# Patient Record
Sex: FEMALE | URBAN - METROPOLITAN AREA
[De-identification: names, ages, dates, MRNs, and addresses within clinical notes are randomized per-mention and may not be internally consistent; named-entity substitution may affect disease eponyms.]

---

## 2024-04-12 ENCOUNTER — TELEPHONE (OUTPATIENT)
Age: 44
End: 2024-04-12

## 2024-04-12 NOTE — TELEPHONE ENCOUNTER
Patient called today to re establish care with Dr Pang.    Patient is scheduled to be seen on 6/19 at 1 PM.    Pt is questioning if provider would like to order a KUB prior?    Pt will have her records faxed to office prior to visit.    Call back [phone]    Clothing

## 2024-04-14 DIAGNOSIS — N20.0 KIDNEY STONE: Primary | ICD-10-CM

## 2024-04-15 NOTE — TELEPHONE ENCOUNTER
Spoke with patient and made aware of KUB order. Patient will have done an hour prior to her appointment.

## 2024-06-17 ENCOUNTER — TELEPHONE (OUTPATIENT)
Dept: UROLOGY | Facility: CLINIC | Age: 44
End: 2024-06-17

## 2024-06-17 RX ORDER — BLOOD SUGAR DIAGNOSTIC
STRIP MISCELLANEOUS
COMMUNITY
Start: 2024-03-15

## 2024-06-17 RX ORDER — LANOLIN ALCOHOL/MO/W.PET/CERES
50 CREAM (GRAM) TOPICAL DAILY
COMMUNITY

## 2024-06-17 RX ORDER — LORATADINE 10 MG/1
10 TABLET ORAL DAILY
COMMUNITY

## 2024-06-17 RX ORDER — SEMAGLUTIDE 0.68 MG/ML
0.5 INJECTION, SOLUTION SUBCUTANEOUS
COMMUNITY
Start: 2024-04-01

## 2024-06-17 RX ORDER — ACETAMINOPHEN AND CODEINE PHOSPHATE 120; 12 MG/5ML; MG/5ML
1 SOLUTION ORAL DAILY
COMMUNITY
End: 2024-06-19 | Stop reason: ALTCHOICE

## 2024-06-17 RX ORDER — PNV NO.95/FERROUS FUM/FOLIC AC 28MG-0.8MG
2 TABLET ORAL DAILY
COMMUNITY

## 2024-06-17 NOTE — TELEPHONE ENCOUNTER
Left message that patient is in need of a KUB before her upcoming appointment. Left on message that she can have this done at the [facility] an hour before her appointment on 6/19/24. If patient returns call please make her aware of the above message.

## 2024-06-17 NOTE — TELEPHONE ENCOUNTER
Patient called stating she will do the xray prior to coming for appointment on 06/19/24. No further action needed

## 2024-06-19 ENCOUNTER — OFFICE VISIT (OUTPATIENT)
Dept: UROLOGY | Facility: CLINIC | Age: 44
End: 2024-06-19
Payer: COMMERCIAL

## 2024-06-19 ENCOUNTER — HOSPITAL ENCOUNTER (OUTPATIENT)
Dept: RADIOLOGY | Facility: HOSPITAL | Age: 44
Discharge: HOME | End: 2024-06-19
Payer: COMMERCIAL

## 2024-06-19 VITALS
WEIGHT: 136.8 LBS | TEMPERATURE: 98.1 F | SYSTOLIC BLOOD PRESSURE: 128 MMHG | HEIGHT: 60 IN | DIASTOLIC BLOOD PRESSURE: 64 MMHG | OXYGEN SATURATION: 99 % | HEART RATE: 103 BPM | RESPIRATION RATE: 16 BRPM | BODY MASS INDEX: 26.86 KG/M2

## 2024-06-19 DIAGNOSIS — N20.0 CALCULUS OF KIDNEY: Primary | ICD-10-CM

## 2024-06-19 DIAGNOSIS — E11.9 TYPE 2 DIABETES MELLITUS WITHOUT COMPLICATION, WITHOUT LONG-TERM CURRENT USE OF INSULIN (HCC): ICD-10-CM

## 2024-06-19 DIAGNOSIS — N20.0 KIDNEY STONE: ICD-10-CM

## 2024-06-19 LAB
SL AMB  POCT GLUCOSE, UA: ABNORMAL
SL AMB LEUKOCYTE ESTERASE,UA: ABNORMAL
SL AMB POCT BILIRUBIN,UA: ABNORMAL
SL AMB POCT BLOOD,UA: ABNORMAL
SL AMB POCT CLARITY,UA: CLEAR
SL AMB POCT COLOR,UA: YELLOW
SL AMB POCT KETONES,UA: ABNORMAL
SL AMB POCT NITRITE,UA: ABNORMAL
SL AMB POCT PH,UA: 6.5
SL AMB POCT SPECIFIC GRAVITY,UA: 1.02
SL AMB POCT URINE PROTEIN: ABNORMAL
SL AMB POCT UROBILINOGEN: 0.2

## 2024-06-19 PROCEDURE — 81003 URINALYSIS AUTO W/O SCOPE: CPT | Performed by: UROLOGY

## 2024-06-19 PROCEDURE — 99214 OFFICE O/P EST MOD 30 MIN: CPT | Performed by: UROLOGY

## 2024-06-19 PROCEDURE — 74018 RADEX ABDOMEN 1 VIEW: CPT

## 2024-06-19 RX ORDER — HYDROCODONE BITARTRATE AND ACETAMINOPHEN 5; 325 MG/1; MG/1
1 TABLET ORAL EVERY 6 HOURS PRN
Qty: 20 TABLET | Refills: 0 | Status: SHIPPED | OUTPATIENT
Start: 2024-06-19

## 2024-06-19 RX ORDER — ATORVASTATIN CALCIUM 80 MG/1
80 TABLET, FILM COATED ORAL DAILY
COMMUNITY
Start: 2024-05-20

## 2024-06-19 RX ORDER — VALACYCLOVIR HYDROCHLORIDE 500 MG/1
500 TABLET, FILM COATED ORAL AS NEEDED
COMMUNITY

## 2024-06-19 RX ORDER — HYDROCODONE BITARTRATE AND ACETAMINOPHEN 5; 325 MG/1; MG/1
1 TABLET ORAL EVERY 6 HOURS PRN
COMMUNITY

## 2024-06-19 RX ORDER — ACETAMINOPHEN AND CODEINE PHOSPHATE 120; 12 MG/5ML; MG/5ML
1 SOLUTION ORAL DAILY
COMMUNITY

## 2024-06-19 RX ORDER — NICOTINE POLACRILEX 4 MG/1
GUM, CHEWING ORAL DAILY
COMMUNITY
Start: 2024-05-06

## 2024-06-19 RX ORDER — TAMSULOSIN HYDROCHLORIDE 0.4 MG/1
0.4 CAPSULE ORAL AS NEEDED
COMMUNITY

## 2024-06-19 RX ORDER — MULTIVITAMIN
1 TABLET ORAL DAILY
COMMUNITY

## 2024-06-19 NOTE — PROGRESS NOTES
UROLOGY PROGRESS NOTE         NAME: Yelitza Otto  AGE: 44 y.o. SEX: female  : 1980   MRN: [id number]    DATE: 2024  TIME: 1:43 PM    Assessment and Plan      Impression:   1. Calculus of kidney  -     POCT urine dip auto non-scope  -     HYDROcodone-acetaminophen (Norco) 5-325 mg per tablet; Take 1 tablet by mouth every 6 (six) hours as needed for pain Max Daily Amount: 4 tablets  -     XR abdomen 1 view kub; Future; Expected date: 2024  2. Type 2 diabetes mellitus without complication, without long-term current use of insulin (HCC)       Plan: Patient is doing well she did have an episode of stone pain with passage of a small stone about a month or so ago.  She intermittently has these episodes.  Her KUB x-ray shows multiple tiny stones in each kidney.  Similar to that noted previously about a year or so ago.  Is important for her to have a pain medication on hand and I am calling in [place] 1-2 p.o. every 6 hours as needed in the event of stone colic.  She is compliant and rarely uses the medication.  The last prescription she received was about 2 years ago.  She is presently doing well without gross hematuria she had some mild microhematuria which is expected given her renal calculi.  We again discussed stone prevention she remains on vitamin B6 magnesium and total fluids of at least 60 ounces daily.  She is on a dietary regimen for calcium oxalate stone prevention.  I will see her back in 1 year with a KUB.  We can see her sooner if issues      Chief Complaint     Chief Complaint   Patient presents with   • New Patient Visit     New patient visit has a history of kidney stones. Patient had KUB done today. Was seen by DR. Pang in the past.     History of Present Illness     HPI: Yelitza Otto is a 44 y.o. year old female who presents with a longstanding history of bilateral renal calculi with occasional stone passage.  Has undergone multiple lithotripsies and stone procedures in the past.   Patient was seen last in [place].  KUB x-ray done earlier today which will be reviewed.  No gross hematuria nausea or vomiting at this point.  Last creatinine normal with PCP.  Is now on Ozempic.              The following portions of the patient's history were reviewed and updated as appropriate: allergies, current medications, past family history, past medical history, past social history, past surgical history and problem list.  Past Medical History:   Diagnosis Date   • Diabetes mellitus type 2, controlled (HCC)    • Hyperlipidemia with target LDL less than 100    • Renal stones     calcium oxalate     Past Surgical History:   Procedure Laterality Date   • LITHOTRIPSY      multiple   • OOPHORECTOMY       shoulder  Review of Systems     Const: Denies chills, fever and weight loss.  CV: Denies chest pain.  Resp: Denies SOB.  GI: Denies abdominal pain, nausea and vomiting.  : Denies symptoms other than stated above.  Musculo: Denies back pain.    Objective   /64   Pulse 103   Temp 98.1 °F (36.7 °C) (Tympanic)   Resp 16   Ht 5' (1.524 m)   Wt 62.1 kg (136 lb 12.8 oz)   SpO2 99%   BMI 26.72 kg/m²     Physical Exam  Const: Appears healthy and well developed. No signs of acute distress present.  Resp: Respirations are regular and unlabored.   CV: Rate is regular. Rhythm is regular.  Abdomen: Abdomen is soft, nontender, and nondistended. Kidneys are not palpable.  : Not performed  Psych: Patient's attitude is cooperative. Mood is normal. Affect is normal.    Procedure   Procedures     Current Medications     Current Outpatient Medications:   •  atorvastatin (LIPITOR) 80 mg tablet, Take 80 mg by mouth daily, Disp: , Rfl:   •  Ferrous Sulfate (Iron) 325 (65 Fe) MG TABS, Take 2 tablets by mouth daily, Disp: , Rfl:   •  HYDROcodone-acetaminophen (NORCO) 5-325 mg per tablet, Take 1 tablet by mouth every 6 (six) hours as needed for pain, Disp: , Rfl:   •  HYDROcodone-acetaminophen (Norco) 5-325 mg per  tablet, Take 1 tablet by mouth every 6 (six) hours as needed for pain Max Daily Amount: 4 tablets, Disp: 20 tablet, Rfl: 0  •  loratadine (CLARITIN) 10 mg tablet, Take 10 mg by mouth daily, Disp: , Rfl:   •  MAGNESIUM CITRATE PO, Take 500 mg by mouth in the morning, Disp: , Rfl:   •  Multiple Vitamin (multivitamin) tablet, Take 1 tablet by mouth daily, Disp: , Rfl:   •  norethindrone (MICRONOR) 0.35 MG tablet, Take 1 tablet by mouth daily, Disp: , Rfl:   •  Omeprazole 20 MG TBEC, in the morning, Disp: , Rfl:   •  OneTouch Verio test strip, test as directed, Disp: , Rfl:   •  Ozempic, 0.25 or 0.5 MG/DOSE, 2 MG/3ML injection pen, Inject 0.5 mg under the skin every 7 days, Disp: , Rfl:   •  pyridoxine (VITAMIN B6) 50 mg tablet, Take 50 mg by mouth daily, Disp: , Rfl:   •  tamsulosin (FLOMAX) 0.4 mg, Take 0.4 mg by mouth if needed, Disp: , Rfl:   •  valACYclovir (VALTREX) 500 mg tablet, Take 500 mg by mouth as needed, Disp: , Rfl:         Jeremias Pang MD

## 2025-03-13 ENCOUNTER — TELEPHONE (OUTPATIENT)
Age: 45
End: 2025-03-13

## 2025-03-13 NOTE — TELEPHONE ENCOUNTER
Patient called to confirm order for KUB on chart .    No further assistance is needed at this time .

## 2025-06-23 ENCOUNTER — TELEPHONE (OUTPATIENT)
Dept: UROLOGY | Facility: CLINIC | Age: 45
End: 2025-06-23

## 2025-06-24 NOTE — PROGRESS NOTES
UROLOGY PROGRESS NOTE         NAME: Yelitza Otto  AGE: 45 y.o. SEX: female  : 1980   MRN: [id number]    DATE: 2025  TIME: 9:25 AM    Assessment and Plan      Impression:   1. Calculus of kidney  -     POCT urine dip auto non-scope  -     tamsulosin (FLOMAX) 0.4 mg; Take 1 capsule (0.4 mg total) by mouth daily with dinner  -     ondansetron (ZOFRAN) 4 mg tablet; Take 1 tablet (4 mg total) by mouth every 8 (eight) hours as needed for nausea or vomiting  -     XR abdomen 1 view kub; Future; Expected date: 2025  2. Type 2 diabetes mellitus without complication, without long-term current use of insulin ([facility])       Plan: Patient has what appears to be a distal left ureteral stone on imaging today.  This was a KUB.  Probably 5 mm.  Her typical stones in each kidney not much change.  No symptoms.  UA positive for blood.  Urine culture sent.  No symptoms.  She wants to try to pass this since she has passed multiple stones previously.  I will see her back with a KUB in about 6 weeks.  She will call if there are any issues she will be on Flomax and she has medication if needed for pain or nausea.  She understands reasons to go to the emergency room including pain despite her medications, persistent nausea vomiting, or fever.      Chief Complaint     Chief Complaint   Patient presents with   • Follow-up     Pt reports today for follow up kidney stones.    • Urinary Urgency     History of Present Illness     HPI: Yelitza Otto is a 45 y.o. year old female who presents with history of bilateral nephrolithiasis.              The following portions of the patient's history were reviewed and updated as appropriate: allergies, current medications, past family history, past medical history, past social history, past surgical history and problem list.  Past Medical History[1]  Past Surgical History[2]  shoulder  Review of Systems     Const: Denies chills, fever and weight loss.  CV: Denies chest pain.  Resp: Denies  SOB.  GI: Denies abdominal pain, nausea and vomiting.  : Denies symptoms other than stated above.  Musculo: Denies back pain.    Objective   /62   Pulse 96   Temp 98.4 °F (36.9 °C)   Ht 5' (1.524 m)   Wt 62.8 kg (138 lb 6.4 oz)   SpO2 97%   BMI 27.03 kg/m²     Physical Exam  Const: Appears healthy and well developed. No signs of acute distress present.  Resp: Respirations are regular and unlabored.   CV: Rate is regular. Rhythm is regular.  Abdomen: Abdomen is soft, nontender, and nondistended. Kidneys are not palpable.  : Not performed  Psych: Patient's attitude is cooperative. Mood is normal. Affect is normal.    Procedure   Procedures     Current Medications   Current Medications[3]        Jeremias Pang MD               [1]  Past Medical History:  Diagnosis Date   • Diabetes mellitus type 2, controlled (HCC)    • Hyperlipidemia with target LDL less than 100    • Renal stones     calcium oxalate   [2]  Past Surgical History:  Procedure Laterality Date   • LITHOTRIPSY      multiple   • OOPHORECTOMY     [3]    Current Outpatient Medications:   •  atorvastatin (LIPITOR) 80 mg tablet, Take 80 mg by mouth in the morning., Disp: , Rfl:   •  Ferrous Sulfate (Iron) 325 (65 Fe) MG TABS, Take 2 tablets by mouth in the morning., Disp: , Rfl:   •  HYDROcodone-acetaminophen (NORCO) 5-325 mg per tablet, Take 1 tablet by mouth every 6 (six) hours as needed for pain, Disp: , Rfl:   •  HYDROcodone-acetaminophen (Norco) 5-325 mg per tablet, Take 1 tablet by mouth every 6 (six) hours as needed for pain Max Daily Amount: 4 tablets, Disp: 20 tablet, Rfl: 0  •  loratadine (CLARITIN) 10 mg tablet, Take 10 mg by mouth in the morning., Disp: , Rfl:   •  MAGNESIUM CITRATE PO, Take 500 mg by mouth in the morning, Disp: , Rfl:   •  Multiple Vitamin (multivitamin) tablet, Take 1 tablet by mouth in the morning., Disp: , Rfl:   •  Omeprazole 20 MG TBEC, in the morning, Disp: , Rfl:   •  ondansetron (ZOFRAN) 4 mg tablet, Take 1  tablet (4 mg total) by mouth every 8 (eight) hours as needed for nausea or vomiting, Disp: 15 tablet, Rfl: 0  •  OneTouch Verio test strip, , Disp: , Rfl:   •  Ozempic, 0.25 or 0.5 MG/DOSE, 2 MG/3ML injection pen, Inject 0.5 mg under the skin every 7 days, Disp: , Rfl:   •  pyridoxine (VITAMIN B6) 50 mg tablet, Take 50 mg by mouth in the morning., Disp: , Rfl:   •  tamsulosin (FLOMAX) 0.4 mg, Take 0.4 mg by mouth if needed, Disp: , Rfl:   •  tamsulosin (FLOMAX) 0.4 mg, Take 1 capsule (0.4 mg total) by mouth daily with dinner, Disp: 30 capsule, Rfl: 3  •  valACYclovir (VALTREX) 500 mg tablet, Take 500 mg by mouth as needed, Disp: , Rfl:   •  norethindrone (MICRONOR) 0.35 MG tablet, Take 1 tablet by mouth daily (Patient not taking: Reported on 6/25/2025), Disp: , Rfl:

## 2025-06-25 ENCOUNTER — OFFICE VISIT (OUTPATIENT)
Dept: UROLOGY | Facility: CLINIC | Age: 45
End: 2025-06-25
Payer: COMMERCIAL

## 2025-06-25 ENCOUNTER — HOSPITAL ENCOUNTER (OUTPATIENT)
Dept: RADIOLOGY | Facility: HOSPITAL | Age: 45
Discharge: HOME | End: 2025-06-25
Payer: COMMERCIAL

## 2025-06-25 VITALS
SYSTOLIC BLOOD PRESSURE: 110 MMHG | DIASTOLIC BLOOD PRESSURE: 62 MMHG | HEART RATE: 96 BPM | HEIGHT: 60 IN | TEMPERATURE: 98.4 F | WEIGHT: 138.4 LBS | BODY MASS INDEX: 27.17 KG/M2 | OXYGEN SATURATION: 97 %

## 2025-06-25 DIAGNOSIS — N20.0 CALCULUS OF KIDNEY: Primary | ICD-10-CM

## 2025-06-25 DIAGNOSIS — E11.9 TYPE 2 DIABETES MELLITUS WITHOUT COMPLICATION, WITHOUT LONG-TERM CURRENT USE OF INSULIN (HCC): ICD-10-CM

## 2025-06-25 DIAGNOSIS — N20.0 CALCULUS OF KIDNEY: ICD-10-CM

## 2025-06-25 LAB
SL AMB  POCT GLUCOSE, UA: NORMAL
SL AMB LEUKOCYTE ESTERASE,UA: NORMAL
SL AMB POCT BILIRUBIN,UA: NORMAL
SL AMB POCT BLOOD,UA: NORMAL
SL AMB POCT CLARITY,UA: CLEAR
SL AMB POCT COLOR,UA: YELLOW
SL AMB POCT KETONES,UA: NORMAL
SL AMB POCT NITRITE,UA: NORMAL
SL AMB POCT PH,UA: 7
SL AMB POCT SPECIFIC GRAVITY,UA: 1.01
SL AMB POCT URINE PROTEIN: NORMAL
SL AMB POCT UROBILINOGEN: 0.2

## 2025-06-25 PROCEDURE — 74018 RADEX ABDOMEN 1 VIEW: CPT

## 2025-06-25 PROCEDURE — 81003 URINALYSIS AUTO W/O SCOPE: CPT | Performed by: UROLOGY

## 2025-06-25 PROCEDURE — 99213 OFFICE O/P EST LOW 20 MIN: CPT | Performed by: UROLOGY

## 2025-06-25 PROCEDURE — 87086 URINE CULTURE/COLONY COUNT: CPT | Performed by: UROLOGY

## 2025-06-25 RX ORDER — ONDANSETRON 4 MG/1
4 TABLET, FILM COATED ORAL EVERY 8 HOURS PRN
Qty: 15 TABLET | Refills: 0 | Status: SHIPPED | OUTPATIENT
Start: 2025-06-25

## 2025-06-25 RX ORDER — TAMSULOSIN HYDROCHLORIDE 0.4 MG/1
0.4 CAPSULE ORAL
Qty: 30 CAPSULE | Refills: 3 | Status: SHIPPED | OUTPATIENT
Start: 2025-06-25

## 2025-06-26 LAB — BACTERIA UR CULT: NORMAL

## 2025-08-13 ENCOUNTER — OFFICE VISIT (OUTPATIENT)
Dept: UROLOGY | Facility: CLINIC | Age: 45
End: 2025-08-13
Payer: COMMERCIAL

## 2025-08-13 ENCOUNTER — HOSPITAL ENCOUNTER (OUTPATIENT)
Dept: RADIOLOGY | Facility: HOSPITAL | Age: 45
Discharge: HOME | End: 2025-08-13
Payer: COMMERCIAL

## 2025-08-13 ENCOUNTER — TELEPHONE (OUTPATIENT)
Dept: UROLOGY | Facility: CLINIC | Age: 45
End: 2025-08-13

## 2025-08-13 DIAGNOSIS — N20.0 CALCULUS OF KIDNEY: ICD-10-CM

## 2025-08-13 PROCEDURE — 74018 RADEX ABDOMEN 1 VIEW: CPT

## 2025-08-14 ENCOUNTER — TELEPHONE (OUTPATIENT)
Age: 45
End: 2025-08-14